# Patient Record
(demographics unavailable — no encounter records)

---

## 2024-12-23 NOTE — REASON FOR VISIT
[Follow Up] : a follow up visit [Patient] : patient [Mother] : mother [FreeTextEntry1] : Scoliosis follow-up

## 2024-12-23 NOTE — PHYSICAL EXAM
[FreeTextEntry1] : General: Examination reveals a well-built, well-nourished individual, who presents to my office walking independently. Patient is in no acute distress. Patient is well oriented in time, place and person with age appropriate mood and affect. Patient is able to get off and on the examination table without any problems. Gross cutaneous examination is normal. There is no significant lymphadenopathy or ligament laxity.    Skin: The skin is intact, warm, pink, and dry over the area examined.   Eyes: normal conjuntiva, normal eyelids and pupils were equal and round.  ENT: normal ears, normal nose and normal lips. Cardiovascular: There is brisk capillary refill in the digits of the affected extremity. They are symmetric pulses in the bilateral upper and lower extremities, positive peripheral pulses, brisk capillary refill, but no peripheral edema. Respiratory: The patient is in no apparent respiratory distress. They're taking full deep breaths without use of accessory muscles or evidence of audible wheezes or stridor without the use of a stethoscope, normal respiratory effort.   Neurological: 5/5 motor strength in the main muscle groups of bilateral lower extremities, sensory intact in bilateral lower extremities.  Clonus on right side, unchanged.  Neurological examination has remained unchanged.  Motor exam is grade 5/5 and sensations intact to crude touch. deep tendon reflexes are 2+.   Spine: Standing evaluation reveals level shoulders and pelvis. Patient appears well-balanced. Well-healed midline spine incision without signs or symptoms of infection. No erythema, no edema. No fluid collection. Calves are soft, nontender. Toes are warm and pink and moving freely. Brisk capillary refill. Sensation grossly intact distally. He is observed jumping, squatting, twisting and bending without significant issue or pain.

## 2024-12-23 NOTE — ASSESSMENT
[FreeTextEntry1] : 14-year-old male with congenital scoliosis; status post kyphotic deformity repair as well as decompression and reconstruction of the spinal column 1/26/2022. Patient is doing well overall.   Today's assessment was performed with the assistance of the patient's parent as an independent historian to corroborate the patient's history. Clinical exam and imaging reviewed with patient and family at length. X-rays remain unchanged with hardware in good position. He is doing well and has resumed activities. He has continued with his home exercises. He may continue to do so. He should continue to refrain from contact sports and activities at this time. I recommended follow-up in 1 year with AP and lateral spine x-ray at that time. Postoperative instructions reviewed including natural history of spine fusion and time for healing. Possibility of late onset infection, nonunion, implant failure, extension of fusion, junctional arthritis, junctional kyphosis, need for implant exchange and removal explained. The importance of regular back and core strengthening for postural support has been discussed. The importance of home exercise regimen has been reiterated. All questions answered, understanding verbalized. Parent and patient in agreement with plan of care.   Follow up in 1 month for repeat exam and XR   We spent 30 minutes on HPI, Clinical exam, ordering/ reviewing all imaging, reviewing any existing record, reviewing findings and counseling patient to treatment, differentials, etiology, prognosis, natural history, implications on ADLs, activities limitations/modifications, genetics, answering questions and addressing concerns, treatment goals and documenting in the EHR.

## 2024-12-23 NOTE — DATA REVIEWED
[Hardware Intact] : hardware is intact [de-identified] : 12/23/24: AP and lateral full-length spine x-ray ordered, obtained and reviewed independently and remain unchanged with hardware in good position. Deformity correction maintained. Patient appears well-balanced.  06/06/24: AP and lateral full-length spine x-ray ordered, obtained and reviewed independently and remain unchanged with hardware in good position. Deformity correction maintained. Patient appears well-balanced.  4/13/2023: AP and lateral full-length spine x-ray ordered, obtained and reviewed independently and remain unchanged with hardware in good position. Deformity correction maintained. Patient appears well-balanced.

## 2024-12-23 NOTE — HISTORY OF PRESENT ILLNESS
[0] : currently ~his/her~ pain is 0 out of 10 [FreeTextEntry1] : 14 year old male who has been operated by Dr. Norman and Dr. Sparks for angular kyphosis and severe cord compression. He presents today for follow up evaluation with his mother. He underwent vertebral column resection with cage insertion and posterior spine fusion on 1/26/2022. Patient has been doing overall well. Of note, patient moved to [place] in the past year. He is participating in full activities without restrictions. He is attending school without issue. He denies complaints of back pain. Denies extremity numbness, tingling, weakness, bowel or bladder dysfunction. He presents today for regularly scheduled follow-up and x-rays.

## 2025-06-12 NOTE — HISTORY OF PRESENT ILLNESS
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 He had a "sick spine" neeed an 11 hour surgery He began puberty around age 9 he us not taking any medications He takes vitamin D 5000 IU daily he will be going into 10th grade.  He exercises at home. Wakes up at 4AM for school so is tired by 6pm. Gets tired after 10 hours. he wanted to sleep more to increase GH naturally. He eats a well balanced diet.   He is living in [place] with dad when he goes to school.  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 mom luous gh stim for adult ghd? genetics info anc contact

## 2025-06-12 NOTE — PAST MEDICAL HISTORY
[At Term] : at term [Normal Vaginal Route] : by normal vaginal route [None] : there were no delivery complications [Speech Delay w/ Normal Development] : patient has speech delay with normal development [Speech Therapy] : speech therapy [FreeTextEntry1] : 4 lbs 13 oz

## 2025-06-13 NOTE — ASSESSMENT
[FreeTextEntry1] : 15-year-old male with congenital scoliosis; status post kyphotic deformity repair as well as decompression and reconstruction of the spinal column 1/26/2022. Patient is doing well overall.   Today's assessment was performed with the assistance of the patient's parent as an independent historian to corroborate the patient's history. Clinical exam and imaging reviewed with patient and family at length. X-rays remain unchanged with hardware in good position. He is doing well and has resumed activities. I recommended follow-up in 1 year with AP and lateral spine x-ray at that time. Postoperative instructions reviewed including natural history of spine fusion and time for healing. Possibility of late onset infection, nonunion, implant failure, extension of fusion, junctional arthritis, junctional kyphosis, need for implant exchange and removal explained. The importance of regular back and core strengthening for postural support has been discussed. The importance of home exercise regimen has been reiterated. All questions answered, understanding verbalized. Parent and patient in agreement with plan of care.   We spent 30 minutes on HPI, Clinical exam, ordering/ reviewing all imaging, reviewing any existing record, reviewing findings and counseling patient to treatment, differentials, etiology, prognosis, natural history, implications on ADLs, activities limitations/modifications, genetics, answering questions and addressing concerns, treatment goals and documenting in the EHR.  This note was generated using Dragon medical dictation software. A reasonable effort has been made for proofreading its contents, but typos may still remain. If there are any questions or points of clarification needed please do not hesitate to contact my office.

## 2025-06-13 NOTE — HISTORY OF PRESENT ILLNESS
[FreeTextEntry1] : 15 year old male who has been operated by Dr. Norman and Dr. Sparks for congenital scoliosis, angular kyphosis and severe cord compression. He presents today for follow up evaluation with his mother. He underwent vertebral column resection with cage insertion and posterior spine fusion on 1/26/2022. Patient has been doing overall well. He is participating in full activities without restrictions. He is attending school without issue. He denies complaints of back pain. Denies extremity numbness, tingling, weakness, bowel or bladder dysfunction.  He denies fever, chills, incisional drainage.  He presents today for regularly scheduled follow-up and x-rays.

## 2025-06-13 NOTE — DATA REVIEWED
[de-identified] : 6/5/2025: AP and lateral full-length standing spine x-ray ordered, obtained and independently reviewed.  Hardware in good position, unchanged from previous imaging.  Deformity correction maintained.  Patient appears well-balanced. Risser 5  12/23/24: AP and lateral full-length spine x-ray ordered, obtained and reviewed independently by me and remain unchanged with hardware in good position. Deformity correction maintained. Patient appears well-balanced.  06/06/24: AP and lateral full-length spine x-ray ordered, obtained and reviewed independently and remain unchanged with hardware in good position. Deformity correction maintained. Patient appears well-balanced.  4/13/2023: AP and lateral full-length spine x-ray ordered, obtained and reviewed independently and remain unchanged with hardware in good position. Deformity correction maintained. Patient appears well-balanced.

## 2025-06-13 NOTE — DATA REVIEWED
[de-identified] : 6/5/2025: AP and lateral full-length standing spine x-ray ordered, obtained and independently reviewed.  Hardware in good position, unchanged from previous imaging.  Deformity correction maintained.  Patient appears well-balanced. Risser 5  12/23/24: AP and lateral full-length spine x-ray ordered, obtained and reviewed independently by me and remain unchanged with hardware in good position. Deformity correction maintained. Patient appears well-balanced.  06/06/24: AP and lateral full-length spine x-ray ordered, obtained and reviewed independently and remain unchanged with hardware in good position. Deformity correction maintained. Patient appears well-balanced.  4/13/2023: AP and lateral full-length spine x-ray ordered, obtained and reviewed independently and remain unchanged with hardware in good position. Deformity correction maintained. Patient appears well-balanced.